# Patient Record
Sex: FEMALE | URBAN - METROPOLITAN AREA
[De-identification: names, ages, dates, MRNs, and addresses within clinical notes are randomized per-mention and may not be internally consistent; named-entity substitution may affect disease eponyms.]

---

## 2024-07-29 ENCOUNTER — HOSPITAL ENCOUNTER (OUTPATIENT)
Dept: RADIOLOGY | Facility: CLINIC | Age: 49
Discharge: HOME | End: 2024-07-29
Payer: COMMERCIAL

## 2024-07-29 ENCOUNTER — APPOINTMENT (OUTPATIENT)
Dept: ORTHOPEDIC SURGERY | Facility: CLINIC | Age: 49
End: 2024-07-29
Payer: COMMERCIAL

## 2024-07-29 VITALS — WEIGHT: 157 LBS | HEIGHT: 64 IN | BODY MASS INDEX: 26.8 KG/M2

## 2024-07-29 DIAGNOSIS — M25.511 RIGHT SHOULDER PAIN, UNSPECIFIED CHRONICITY: ICD-10-CM

## 2024-07-29 PROCEDURE — 73030 X-RAY EXAM OF SHOULDER: CPT | Mod: RT

## 2024-07-29 PROCEDURE — 3008F BODY MASS INDEX DOCD: CPT | Performed by: STUDENT IN AN ORGANIZED HEALTH CARE EDUCATION/TRAINING PROGRAM

## 2024-07-29 PROCEDURE — 73030 X-RAY EXAM OF SHOULDER: CPT | Mod: RIGHT SIDE | Performed by: RADIOLOGY

## 2024-07-29 PROCEDURE — 99204 OFFICE O/P NEW MOD 45 MIN: CPT | Performed by: STUDENT IN AN ORGANIZED HEALTH CARE EDUCATION/TRAINING PROGRAM

## 2024-07-29 RX ORDER — IBUPROFEN 800 MG/1
800 TABLET ORAL EVERY 8 HOURS PRN
COMMUNITY

## 2024-07-29 ASSESSMENT — PAIN SCALES - GENERAL: PAINLEVEL_OUTOF10: 6

## 2024-07-29 ASSESSMENT — PAIN - FUNCTIONAL ASSESSMENT: PAIN_FUNCTIONAL_ASSESSMENT: 0-10

## 2024-07-29 ASSESSMENT — PAIN DESCRIPTION - DESCRIPTORS: DESCRIPTORS: NUMBNESS;TINGLING

## 2024-07-29 NOTE — PROGRESS NOTES
PRIMARY CARE PHYSICIAN: No primary care provider on file.  REFERRING PROVIDER: No referring provider defined for this encounter.     CONSULT ORTHOPAEDIC: Shoulder Evaluation    ASSESSMENT & PLAN    Impression: 48 y.o. female with a right shoulder injury that occurred at work concerning for rotator cuff tear versus nerve injury.    Plan:   I explained to the patient the nature of their diagnosis.  I reviewed their imaging studies with them.    Based on the history, physical exam and imaging studies above, the patient's presentation is consistent with the above diagnosis.  I had a long discussion with the patient regarding their presentation and the treatment options.  We discussed initial nonoperative versus operative management options as well as potential further diagnostic imaging.  I reviewed the patient's imaging studies with her.  We discussed treatment options going forward.  This is an acute traumatic injury that occurred at work and she has had persistent pain and dysfunction in this right side since despite rotator cuff surgery x 2.  I recommend an MRI of the right shoulder to evaluate the status of her prior repair and rule out a full-thickness retear.  I also think that she would benefit from an EMG to the right upper extremity as she was having nerve symptoms down into her fingers.    Follow-Up: Patient will follow-up with me as needed.  She is planned to follow-up with her primary surgeon.    At the end of the visit, all questions were answered in full. The patient is in agreement with the plan and recommendations. They will call the office with any questions/concerns.    Note dictated with [employer] software. Completed without full typed error editing and sent to avoid delay.       SUBJECTIVE  CHIEF COMPLAINT:   Chief Complaint   Patient presents with    Right Shoulder - Pain     BWC:         HPI: Glenda Sellers is a 48 y.o. patient who presents today with right shoulder injury. Pt  was moving a truck battery and was putting it up on a shelf and lost her balance. The battery fell downward and pulled her arm down tearing her rotator cuff. She has Sx 10/2021 and 4/2022 to repair the tears. She has had CSI; last given year ago that gave no relief. She has had PT, which helped a little bit. She still has pains in the posterior shoulder and along the clavicle, and trouble grasping things. She reports of neck pain, numbness, tingling, or paresthesias. She takes NSAID's as needed for pain.  She notes that she has pain that radiates down her right arm into her hand.    [facility] DOI: 5/20/2021    REVIEW OF SYSTEMS  Constitutional: See HPI for pain assessment, No significant weight loss, recent trauma  Cardiovascular: No chest pain, shortness of breath  Respiratory: No difficulty breathing, cough  Gastrointestinal: No nausea, vomiting, diarrhea, constipation  Musculoskeletal: Noted in HPI, positive for pain, restricted motion, stiffness  Integumentary: No rashes, easy bruising, redness   Neurological: no numbness or tingling in extremities, no gait disturbances   Psychiatric: No mood changes, memory changes, social issues  Heme/Lymph: no excessive swelling, easy bruising, excessive bleeding  ENT: No hearing changes  Eyes: No vision changes    No past medical history on file.     Not on File     No past surgical history on file.     No family history on file.     Social History     Socioeconomic History    Marital status: Unknown     Spouse name: Not on file    Number of children: Not on file    Years of education: Not on file    Highest education level: Not on file   Occupational History    Not on file   Tobacco Use    Smoking status: Not on file    Smokeless tobacco: Not on file   Substance and Sexual Activity    Alcohol use: Not on file    Drug use: Not on file    Sexual activity: Not on file   Other Topics Concern    Not on file   Social History Narrative    Not on file     Social Determinants of Health      Financial Resource Strain: Not on file   Food Insecurity: Not on file   Transportation Needs: Not on file   Physical Activity: Not on file   Stress: Not on file   Social Connections: Not on file   Intimate Partner Violence: Not on file   Housing Stability: Not on file        CURRENT MEDICATIONS:   No current outpatient medications on file.     No current facility-administered medications for this visit.        OBJECTIVE    PHYSICAL EXAM       No data to display               There is no height or weight on file to calculate BMI.    GENERAL: A/Ox3, NAD. Appears healthy, well nourished  PSYCHIATRIC: Mood stable, appropriate memory recall  EYES: EOM intact, no scleral icterus  CARDIOVASCULAR: Palpable peripheral pulses  LUNGS: Breathing non-labored on room air  SKIN: no erythema, rashes, or ecchymosis     MUSCULOSKELETAL:  Laterality: right Shoulder Exam  - Negative Spurlings, full painless neck ROM  - Skin intact, prior surgical scars well-healed  - No erythema or warmth  - No ecchymosis or soft tissue swelling  - Shoulder ROM: Forward flexion 140 with a significant hitch at 90, ER 30, IR lower lumbar  - Strength:      Jobes 4-/5     ER 4/5     Belly press and bearhug 4/5  - Palpation: Positive tenderness anterior and lateral shoulder  - Posey and Neers positive  - Speeds and O'Briens equivocal    NEUROVASCULAR:  - Neurovascular Status: sensation intact to light touch distally, upper extremity motor grossly intact  - Capillary refill brisk at extremities, Bilateral peripheral pulses 2+    Imaging: Multiple views of the affected right shoulder(s) demonstrate: Cystic changes in the greater tuberosity consistent with prior rotator cuff repair, mild degenerative changes of the glenohumeral joint without acute osseous abnormality.   X-rays were personally reviewed and interpreted by me.  Radiology reports were reviewed by me as well, if readily available at the time.      Raúl Umanzor MD  Attending  Surgeon    Sports Medicine Orthopaedic Surgery  [facility] [place]  [facility] [employer]

## 2025-02-17 ENCOUNTER — APPOINTMENT (OUTPATIENT)
Dept: ORTHOPEDIC SURGERY | Facility: CLINIC | Age: 50
End: 2025-02-17
Payer: COMMERCIAL

## 2025-02-17 VITALS — HEIGHT: 65 IN | BODY MASS INDEX: 25.66 KG/M2 | WEIGHT: 154 LBS

## 2025-02-17 DIAGNOSIS — M67.813 TENDINOSIS OF RIGHT ROTATOR CUFF: ICD-10-CM

## 2025-02-17 DIAGNOSIS — M25.811 SHOULDER IMPINGEMENT, RIGHT: Primary | ICD-10-CM

## 2025-02-17 ASSESSMENT — PAIN - FUNCTIONAL ASSESSMENT: PAIN_FUNCTIONAL_ASSESSMENT: 0-10

## 2025-02-17 ASSESSMENT — PAIN SCALES - GENERAL: PAINLEVEL_OUTOF10: 8

## 2025-02-17 NOTE — PROGRESS NOTES
PRIMARY CARE PHYSICIAN: No primary care provider on file.  REFERRING PROVIDER: No referring provider defined for this encounter.     CONSULT ORTHOPAEDIC: Shoulder Evaluation    ASSESSMENT & PLAN    Impression: 49 y.o. female with a right shoulder injury that occurred at work concerning for rotator cuff tear versus nerve injury.    Plan:   I explained to the patient the nature of their diagnosis.  I reviewed their imaging studies with them.    Based on the history, physical exam and imaging studies above, the patient's presentation is consistent with the above diagnosis.  I had a long discussion with the patient regarding their presentation and the treatment options.  We discussed initial nonoperative versus operative management options as well as potential further diagnostic imaging.  I reviewed the patient's imaging studies with her.  We discussed treatment options going forward.  This is an acute traumatic injury that occurred at work and she has had persistent pain and dysfunction in this right side since despite rotator cuff surgery x 2.  MRI demonstrates no evidence of retear with an intact rotator cuff.  Therefore I recommend proceeding with physical therapy to work on her shoulder range of motion, rotator cuff strength and dynamic stability.  She will avoid aggravating motions and positions.  We will submit for a new C9 for physical therapy.    Follow-Up: Patient will follow-up with me as needed.    At the end of the visit, all questions were answered in full. The patient is in agreement with the plan and recommendations. They will call the office with any questions/concerns.    Note dictated with [employer] software. Completed without full typed error editing and sent to avoid delay.       SUBJECTIVE  CHIEF COMPLAINT:   Chief Complaint   Patient presents with    Right Shoulder - Pain, Follow-up     [facility] DOI: 5/20/2021        HPI: Glenda Sellers is a 49 y.o. patient who presents today with  right shoulder injury. She had an MRI and EMG done at [facility] in [place], [place]. She has not brought a disc for the MRI and has the EMG report on her phone.     7/29/2024 HPI Visit Info:   Pt was moving a truck battery and was putting it up on a shelf and lost her balance. The battery fell downward and pulled her arm down tearing her rotator cuff. She has Sx 10/2021 and 4/2022 to repair the tears. She has had CSI; last given year ago that gave no relief. She has had PT, which helped a little bit. She still has pains in the posterior shoulder and along the clavicle, and trouble grasping things. She reports of neck pain, numbness, tingling, or paresthesias. She takes NSAID's as needed for pain.  She notes that she has pain that radiates down her right arm into her hand.    [facility] DOI: 5/20/2021    REVIEW OF SYSTEMS  Constitutional: See HPI for pain assessment, No significant weight loss, recent trauma  Cardiovascular: No chest pain, shortness of breath  Respiratory: No difficulty breathing, cough  Gastrointestinal: No nausea, vomiting, diarrhea, constipation  Musculoskeletal: Noted in HPI, positive for pain, restricted motion, stiffness  Integumentary: No rashes, easy bruising, redness   Neurological: no numbness or tingling in extremities, no gait disturbances   Psychiatric: No mood changes, memory changes, social issues  Heme/Lymph: no excessive swelling, easy bruising, excessive bleeding  ENT: No hearing changes  Eyes: No vision changes    No past medical history on file.     Allergies   Allergen Reactions    Amoxicillin Anaphylaxis    Betadine [Povidone-Iodine] Anaphylaxis    Biaxin [Clarithromycin] Anaphylaxis    Ceftin [Cefuroxime Axetil] Anaphylaxis    Codeine Anaphylaxis    Cortisone Anaphylaxis    Iodinated Contrast Media Anaphylaxis    Morphine Anaphylaxis    Penicillins Anaphylaxis    Iodine Hives    Lidocaine Chills        No past surgical history on file.     No family history on file.     Social  "History     Socioeconomic History    Marital status: Unknown     Spouse name: Not on file    Number of children: Not on file    Years of education: Not on file    Highest education level: Not on file   Occupational History    Not on file   Tobacco Use    Smoking status: Every Day     Types: Cigarettes    Smokeless tobacco: Never   Substance and Sexual Activity    Alcohol use: Not Currently    Drug use: Never    Sexual activity: Not on file   Other Topics Concern    Not on file   Social History Narrative    Not on file     Social Drivers of Health     Financial Resource Strain: Not on file   Food Insecurity: Not on file   Transportation Needs: Not on file   Physical Activity: Not on file   Stress: Not on file   Social Connections: Not on file   Intimate Partner Violence: Not on file   Housing Stability: Not on file        CURRENT MEDICATIONS:   Current Outpatient Medications   Medication Sig Dispense Refill    ibuprofen 800 mg tablet Take 1 tablet (800 mg) by mouth every 8 hours if needed for mild pain (1 - 3).      loratadine (Claritin) 5 mg chewable tablet Chew 1 tablet (5 mg) once daily.       No current facility-administered medications for this visit.        OBJECTIVE    PHYSICAL EXAM      7/29/2024    11:37 AM 2/17/2025    11:05 AM   Vitals   Height 1.626 m (5' 4\") 1.651 m (5' 5\")   Weight (lb) 157 154   BMI 26.95 kg/m2 25.63 kg/m2   BSA (m2) 1.79 m2 1.79 m2   Visit Report Report Report      Body mass index is 25.63 kg/m².    GENERAL: A/Ox3, NAD. Appears healthy, well nourished  PSYCHIATRIC: Mood stable, appropriate memory recall  EYES: EOM intact, no scleral icterus  CARDIOVASCULAR: Palpable peripheral pulses  LUNGS: Breathing non-labored on room air  SKIN: no erythema, rashes, or ecchymosis     MUSCULOSKELETAL:  Laterality: right Shoulder Exam  - Negative Spurlings, full painless neck ROM  - Skin intact, prior surgical scars well-healed  - No erythema or warmth  - No ecchymosis or soft tissue swelling  - " Shoulder ROM: Forward flexion 150, ER 40, IR mid lumbar  - Strength:      Jobes 4+/5     ER 4+/5     Belly press and bearhug 4+/5  - Palpation: Positive tenderness anterior and lateral shoulder  - Posey and Neers positive  - Speeds and O'Briens equivocal    NEUROVASCULAR:  - Neurovascular Status: sensation intact to light touch distally, upper extremity motor grossly intact  - Capillary refill brisk at extremities, Bilateral peripheral pulses 2+    Imaging: Outside MRI read (images unavailable for review close patient did not bring the disc), and outside EMG read both demonstrate no significant abnormality with an intact rotator cuff and no evidence of focal nerve injury.  Images were personally reviewed and interpreted by me.  Radiology reports were reviewed by me as well, if readily available at the time.      Raúl Umanzor MD  Attending Surgeon    Sports Medicine Orthopaedic Surgery  [facility] [place]  [facility] [employer]